# Patient Record
Sex: FEMALE | URBAN - METROPOLITAN AREA
[De-identification: names, ages, dates, MRNs, and addresses within clinical notes are randomized per-mention and may not be internally consistent; named-entity substitution may affect disease eponyms.]

---

## 2018-04-25 ENCOUNTER — OFFICE VISIT (OUTPATIENT)
Dept: FAMILY MEDICINE CLINIC | Facility: CLINIC | Age: 43
End: 2018-04-25

## 2018-04-25 VITALS
SYSTOLIC BLOOD PRESSURE: 110 MMHG | WEIGHT: 214 LBS | RESPIRATION RATE: 14 BRPM | OXYGEN SATURATION: 98 % | DIASTOLIC BLOOD PRESSURE: 70 MMHG | BODY MASS INDEX: 36.54 KG/M2 | TEMPERATURE: 99 F | HEART RATE: 93 BPM | HEIGHT: 64 IN

## 2018-04-25 DIAGNOSIS — R09.82 PND (POST-NASAL DRIP): ICD-10-CM

## 2018-04-25 DIAGNOSIS — J30.9 ALLERGIC RHINITIS, UNSPECIFIED SEASONALITY, UNSPECIFIED TRIGGER: Primary | ICD-10-CM

## 2018-04-25 PROCEDURE — 99202 OFFICE O/P NEW SF 15 MIN: CPT | Performed by: NURSE PRACTITIONER

## 2018-04-25 NOTE — PATIENT INSTRUCTIONS
Allergic Rhinitis  Allergic rhinitis is an allergic reaction that affects the nose, and often the eyes. It’s often known as nasal allergies. Nasal allergies are often due to things in the environment that are breathed in.  Depending what you are sensitive · Keep humidity low by using a dehumidifier or air conditioner. Keep the dehumidifier and air conditioner clean and free of mold. · Clean moldy areas with bleach and water. In general:  · Vacuum once or twice a week.  If possible, use a vacuum with a high · Cough twice—2 short coughs. · Relax for a few seconds. Repeat the steps as needed.   The “rajput” technique  Here is how to do it:   · Sit on a chair with both feet on the floor. · Take a slow, deep breath through your nose. Hold for 2 counts.   · To william

## 2018-04-25 NOTE — PROGRESS NOTES
CHIEF COMPLAINT:   Patient presents with:  Cough      HPI:   Marcus Simmons is a 40year old female who presents with URI symptoms for 8 days. Onset was gradual. Symptoms are persistent. Location is reported as upper chest and mid face.  Description is report HEAD: atraumatic, normocephalic, no tenderness on palpation of maxillary sinuses is present.   EYES: conjunctiva clear, EOM intact, normal pupils, PERRLA  EARS: Canals are clear, TM's are pearly white and intact, no bulging, bony landmarks are visible, flui Comfort care instructions as listed below and in patient Instructions. Patient instructed to consider OTC guaifenesin per box instructions.   Patient instructed to consider OTC saline nasal spray per box instructions  Patient instructed to consider OTC d · Wear a filter mask when mowing or doing yard work. House dust mites:  · Wash bedding every week in warm water and detergent and dry on a hot setting.   · Cover the mattress, box spring, and pillows with allergy covers.   · If possible, sleep in a room wi Airway clearance techniques help to remove mucus from your airways. Clearing the airways helps you breathe better and lowers the chance for infection. One method is controlled coughing.  Be sure to also use any medicines before or after clearing your airway Patient instructed to follow up with clinic/PCP if symptoms persist, or go to ER if symptoms become severe. Patient agreed with plan and verbalized understanding of all instructions.